# Patient Record
Sex: FEMALE | Race: WHITE | NOT HISPANIC OR LATINO | URBAN - METROPOLITAN AREA
[De-identification: names, ages, dates, MRNs, and addresses within clinical notes are randomized per-mention and may not be internally consistent; named-entity substitution may affect disease eponyms.]

---

## 2018-09-09 ENCOUNTER — EMERGENCY (EMERGENCY)
Facility: HOSPITAL | Age: 33
LOS: 0 days | Discharge: LEFT BEFORE DISCHARGE | End: 2018-09-09
Attending: EMERGENCY MEDICINE | Admitting: EMERGENCY MEDICINE

## 2018-09-09 VITALS
WEIGHT: 145.95 LBS | TEMPERATURE: 97 F | DIASTOLIC BLOOD PRESSURE: 73 MMHG | OXYGEN SATURATION: 97 % | RESPIRATION RATE: 20 BRPM | HEART RATE: 101 BPM | SYSTOLIC BLOOD PRESSURE: 158 MMHG

## 2018-09-09 VITALS
HEART RATE: 85 BPM | RESPIRATION RATE: 20 BRPM | OXYGEN SATURATION: 95 % | SYSTOLIC BLOOD PRESSURE: 105 MMHG | DIASTOLIC BLOOD PRESSURE: 59 MMHG

## 2018-09-09 DIAGNOSIS — Z91.018 ALLERGY TO OTHER FOODS: ICD-10-CM
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
## 2018-09-09 DIAGNOSIS — T78.2XXA ANAPHYLACTIC SHOCK, UNSPECIFIED, INITIAL ENCOUNTER: ICD-10-CM
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
## 2018-09-09 DIAGNOSIS — R21 RASH AND OTHER NONSPECIFIC SKIN ERUPTION: ICD-10-CM
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
## 2018-09-09 RX ORDER — DEXAMETHASONE 0.5 MG/5ML
10 ELIXIR ORAL ONCE
Qty: 0 | Refills: 0 | Status: COMPLETED | OUTPATIENT
Start: 2018-09-09 | End: 2018-09-09

## 2018-09-09 RX ORDER — EPINEPHRINE 0.3 MG/.3ML
0.3 INJECTION INTRAMUSCULAR; SUBCUTANEOUS
Qty: 0.3 | Refills: 0 | OUTPATIENT
Start: 2018-09-09

## 2018-09-09 RX ORDER — DIPHENHYDRAMINE HCL 50 MG
50 CAPSULE ORAL ONCE
Qty: 0 | Refills: 0 | Status: COMPLETED | OUTPATIENT
Start: 2018-09-09 | End: 2018-09-09

## 2018-09-09 RX ORDER — SODIUM CHLORIDE 9 MG/ML
1000 INJECTION INTRAMUSCULAR; INTRAVENOUS; SUBCUTANEOUS ONCE
Qty: 0 | Refills: 0 | Status: COMPLETED | OUTPATIENT
Start: 2018-09-09 | End: 2018-09-09

## 2018-09-09 RX ORDER — FAMOTIDINE 10 MG/ML
20 INJECTION INTRAVENOUS ONCE
Qty: 0 | Refills: 0 | Status: COMPLETED | OUTPATIENT
Start: 2018-09-09 | End: 2018-09-09

## 2018-09-09 RX ORDER — EPINEPHRINE 0.3 MG/.3ML
0.3 INJECTION INTRAMUSCULAR; SUBCUTANEOUS ONCE
Qty: 0 | Refills: 0 | Status: COMPLETED | OUTPATIENT
Start: 2018-09-09 | End: 2018-09-09

## 2018-09-09 RX ADMIN — Medication 50 MILLIGRAM(S): at 11:38

## 2018-09-09 RX ADMIN — Medication 125 MILLIGRAM(S): at 11:42

## 2018-09-09 RX ADMIN — Medication 10 MILLIGRAM(S): at 16:45

## 2018-09-09 RX ADMIN — EPINEPHRINE 0.3 MILLIGRAM(S): 0.3 INJECTION INTRAMUSCULAR; SUBCUTANEOUS at 11:34

## 2018-09-09 RX ADMIN — SODIUM CHLORIDE 1000 MILLILITER(S): 9 INJECTION INTRAMUSCULAR; INTRAVENOUS; SUBCUTANEOUS at 11:38

## 2018-09-09 RX ADMIN — FAMOTIDINE 20 MILLIGRAM(S): 10 INJECTION INTRAVENOUS at 12:23

## 2018-09-09 NOTE — ED ADULT NURSE REASSESSMENT NOTE - NS ED NURSE REASSESS COMMENT FT1
s/p allergic reaction at home after eating nuts. continuous pulse ox monitoring in place,. no respiratory distress, speaking in full sentences. maintaining own ariway
patient resting comfortably. will continue to monitor and assess, breathing comfortably.

## 2018-09-09 NOTE — ED PROVIDER NOTE - OBJECTIVE STATEMENT
Pt is a 34 y/o female with hx of allergy to nuts, presents to ED for skin rash with itching and throat swelling that started 1 hour PTA after eating nuts. Pt states took H2 blocker and epipen with mild relief. Pt denies abd pain, n/v.

## 2018-09-09 NOTE — ED PROVIDER NOTE - PROGRESS NOTE DETAILS
Pt partially improved after epi in the ED.  No resp distress, wheezing, stridor, still feels some throat swelling, mostly L side. Pt resting comfortably, no progression of sx. Pt endorsed to Dr. Barraza. Pt has no complaints at this time, will discharge.

## 2018-09-09 NOTE — ED PROVIDER NOTE - PHYSICAL EXAMINATION
Constitutional: Well developed, well nourished. NAD.  Head: Normocephalic, atraumatic.  Eyes: PERRL. EOMI.  ENT: No nasal discharge. Mucous membranes moist. No tongue, lip, uvular swelling. Airway patent.  Neck: Supple. Painless ROM.  Cardiovascular: Normal S1, S2. Regular rate and rhythm. No murmurs, rubs, or gallops.  Pulmonary: Normal respiratory rate and effort. Lungs clear to auscultation bilaterally. No wheezing, rales, or rhonchi.  Abdominal: Soft. Nondistended. Nontender. No rebound, guarding, rigidity.  Extremities. Pelvis stable. No lower extremity edema, symmetric calves.  Skin: + pruritic rash to upper extremities.  Neuro: AAOx3. No focal neurological deficits.  Psych: Normal mood. Normal affect.

## 2018-09-09 NOTE — ED PROVIDER NOTE - ATTENDING CONTRIBUTION TO CARE
This is a 33yoF known tree nut allergy who presents for throat swelling, itching and subjective difficulty swallowing immediately after eating 1 peanut.  She has previously eaten peanuts w/o issue though her allergist told her to avoid them. She felt her throat swelling and skin itching and used her epipen. When her sx persisted after epipen use, she called EMS.  No fever, recent illness.  No n/v.  No SOB, wheezing, stridor. Hives on her R forearm have since improved.    PMH: gestational DM  Meds: none  NKDA, known allergy to tree nuts  SH: lives at home    VS  /73  CONSTITUTIONAL: well developed; well nourished; well appearing in no acute distress  HEAD: normocephalic; atraumatic  EYES: PERRL, no conjunctival injection, no scleral icterus  ENT: no nasal discharge  M: MMM prominent tonsils, equivocal uvular edema, no lingual or lip edema  NECK: supple; non tender. + full passive ROM in all directions  CARD: S1, S2 normal; no murmurs, gallops, or rubs. Regular rate and rhythm  RESP: no stridor, no wheezes, rales or rhonchi. Good air movement bilaterally without significant accessory muscle use  EXT: moving all extremities spontaneously, normal ROM. No clubbing, cyanosis or edema  SKIN: warm and dry, no lesions noted  NEURO: alert, oriented, CN II-XII grossly intact,motor and sensory grossly intact, speech nonslurred, stable gait, no focal deficits. GCS 15  PSYCH: calm, cooperative, appropriate, good eye contact, logical thought process, no apparent danger to self or others    epi 0.3 IM  solumedrol, benadryl, pepcid  1L IV NS    reassess This is a 33yoF known tree nut allergy who presents for throat swelling, itching and subjective difficulty swallowing immediately after eating 1 peanut.  She has previously eaten peanuts w/o issue though her allergist told her to avoid them. She felt her throat swelling and skin itching and used her epipen. When her sx persisted after epipen use, she called EMS.  No fever, recent illness.  No n/v.  No SOB, wheezing, stridor. Hives on her R forearm have since improved.    PMH: gestational DM  Meds: none  NKDA, known allergy to tree nuts  SH: lives at home    VS  /73  CONSTITUTIONAL: well developed; well nourished; well appearing in no acute distress  HEAD: normocephalic; atraumatic  EYES: no conjunctival injection, no scleral icterus  ENT: no nasal discharge  M: MMM prominent tonsils, equivocal uvular edema, no lingual or lip edema  NECK: supple; non tender. + full passive ROM in all directions  CARD: S1, S2 normal; no murmurs, gallops, or rubs. Regular rate and rhythm  RESP: no stridor, no wheezes, rales or rhonchi. Good air movement bilaterally without significant accessory muscle use  EXT: moving all extremities spontaneously, normal ROM. No clubbing, cyanosis or edema  SKIN: warm and dry, no lesions noted  NEURO: alert, oriented, CN II-XII grossly intact, motor and sensory grossly intact, speech nonslurred, no focal deficits. GCS 15  PSYCH: calm, cooperative, appropriate, good eye contact, logical thought process, no apparent danger to self or others    epi 0.3 IM  solumedrol, benadryl, pepcid  1L IV NS    reassess

## 2018-09-09 NOTE — ED ADULT NURSE NOTE - NS ED NURSE ELOPE COMMENTS
MD aware patient eloped. patient alert and oriented x 3, steady gait. MD aware patient eloped. patient alert and oriented x 3, steady gait. IV Lock removed prior to eloping

## 2018-09-09 NOTE — ED ADULT NURSE NOTE - OBJECTIVE STATEMENT
pt presents to ER with difficulty swallowing and difficulty breathing after eating nuts. patient is alert and in no respiratory distress, speaking in full sentences. patient used epi-pen at home

## 2018-09-09 NOTE — ED PROVIDER NOTE - MEDICAL DECISION MAKING DETAILS
I personally evaluated the patient. I reviewed the Resident’s or Physician Assistant’s note (as assigned above), and agree with the findings and plan except as documented in my note. Patient has known allergy to tree nut, had an allergic reaction after eating peanut today, self administered epinephrine. Patient came to the ed, received epi in the ed at 1130, patient states she feels  much better. Repeat exam unremarkable, patient requesting to go home. Epi given second time because of "weird sensation" while swallowing. NO active cp/sob, Patient has eloped, does not want to stay in the ed, patient will be given decadron, patient has an epi pen at home and will see personal allergist in the morning, patient vieyra snot want to wait for ama. Patient understands risks and benefits of her decision and has full capacity.

## 2018-09-14 ENCOUNTER — EMERGENCY (EMERGENCY)
Facility: HOSPITAL | Age: 33
LOS: 0 days | Discharge: HOME | End: 2018-09-15
Attending: EMERGENCY MEDICINE | Admitting: EMERGENCY MEDICINE

## 2018-09-14 VITALS
SYSTOLIC BLOOD PRESSURE: 122 MMHG | TEMPERATURE: 98 F | DIASTOLIC BLOOD PRESSURE: 81 MMHG | OXYGEN SATURATION: 96 % | HEART RATE: 86 BPM | RESPIRATION RATE: 18 BRPM

## 2018-09-14 DIAGNOSIS — T78.40XA ALLERGY, UNSPECIFIED, INITIAL ENCOUNTER: ICD-10-CM

## 2018-09-14 DIAGNOSIS — Z79.899 OTHER LONG TERM (CURRENT) DRUG THERAPY: ICD-10-CM

## 2018-09-14 DIAGNOSIS — Z79.52 LONG TERM (CURRENT) USE OF SYSTEMIC STEROIDS: ICD-10-CM

## 2018-09-14 DIAGNOSIS — F32.9 MAJOR DEPRESSIVE DISORDER, SINGLE EPISODE, UNSPECIFIED: ICD-10-CM

## 2018-09-14 DIAGNOSIS — F32.9 MAJOR DEPRESSIVE DISORDER, SINGLE EPISODE, UNSPECIFIED: Chronic | ICD-10-CM

## 2018-09-14 DIAGNOSIS — L50.0 ALLERGIC URTICARIA: ICD-10-CM

## 2018-09-14 RX ORDER — SODIUM CHLORIDE 9 MG/ML
1000 INJECTION INTRAMUSCULAR; INTRAVENOUS; SUBCUTANEOUS ONCE
Qty: 0 | Refills: 0 | Status: COMPLETED | OUTPATIENT
Start: 2018-09-14 | End: 2018-09-14

## 2018-09-14 RX ORDER — ONDANSETRON 8 MG/1
4 TABLET, FILM COATED ORAL ONCE
Qty: 0 | Refills: 0 | Status: COMPLETED | OUTPATIENT
Start: 2018-09-14 | End: 2018-09-14

## 2018-09-14 RX ORDER — EPINEPHRINE 0.3 MG/.3ML
0.3 INJECTION INTRAMUSCULAR; SUBCUTANEOUS ONCE
Qty: 0 | Refills: 0 | Status: COMPLETED | OUTPATIENT
Start: 2018-09-14 | End: 2018-09-14

## 2018-09-14 RX ORDER — FAMOTIDINE 10 MG/ML
20 INJECTION INTRAVENOUS ONCE
Qty: 0 | Refills: 0 | Status: COMPLETED | OUTPATIENT
Start: 2018-09-14 | End: 2018-09-14

## 2018-09-14 RX ORDER — DIPHENHYDRAMINE HCL 50 MG
50 CAPSULE ORAL ONCE
Qty: 0 | Refills: 0 | Status: COMPLETED | OUTPATIENT
Start: 2018-09-14 | End: 2018-09-14

## 2018-09-14 RX ADMIN — EPINEPHRINE 0.3 MILLIGRAM(S): 0.3 INJECTION INTRAMUSCULAR; SUBCUTANEOUS at 23:46

## 2018-09-14 RX ADMIN — FAMOTIDINE 20 MILLIGRAM(S): 10 INJECTION INTRAVENOUS at 23:36

## 2018-09-14 RX ADMIN — ONDANSETRON 4 MILLIGRAM(S): 8 TABLET, FILM COATED ORAL at 23:47

## 2018-09-14 RX ADMIN — SODIUM CHLORIDE 2000 MILLILITER(S): 9 INJECTION INTRAMUSCULAR; INTRAVENOUS; SUBCUTANEOUS at 23:36

## 2018-09-14 RX ADMIN — Medication 50 MILLIGRAM(S): at 23:37

## 2018-09-14 NOTE — ED PROVIDER NOTE - MEDICAL DECISION MAKING DETAILS
pt with allergic reaction, given epi in ED, observed for 6 hours with no recurrence.  pt dc with meds and told to f/u with allergist

## 2018-09-14 NOTE — ED PROVIDER NOTE - OBJECTIVE STATEMENT
33 y.o. F with no PMH presents with SOB chest pain for 30 minutes. She felt her throat swell up after doing laundry, and chest pain, she felt pain in her stomach upon lying down. She states that this happened to her before on sunday, and she took an epipen which helped her symptoms. 33 y.o. F with no PMH presents with SOB for 30 minutes. She felt her throat swell up after doing laundry, she felt pain in her stomach upon lying down. She states that this happened to her before on sunday, and she took an Epipen which helped her symptoms. She has never experienced this before sunday.

## 2018-09-14 NOTE — ED ADULT NURSE NOTE - NSIMPLEMENTINTERV_GEN_ALL_ED
Implemented All Universal Safety Interventions:  [place] to call system. Call bell, personal items and telephone within reach. Instruct patient to call for assistance. Room bathroom lighting operational. Non-slip footwear when patient is off stretcher. Physically safe environment: no spills, clutter or unnecessary equipment. Stretcher in lowest position, wheels locked, appropriate side rails in place.

## 2018-09-14 NOTE — ED ADULT TRIAGE NOTE - CHIEF COMPLAINT QUOTE
Pt with C.O throat star swollen 30  minuted ago difficulty to swallow ,pt with previous allergic reaction in the past

## 2018-09-14 NOTE — ED PROVIDER NOTE - PHYSICAL EXAMINATION
CONSTITUTIONAL: Well-developed; well-nourished; in mild distress due to shortness of breath  SKIN: +urticaria on left hand, warm, dry  HEAD: Normocephalic; atraumatic.  ENT: No nasal discharge; airway clear.  NECK: Supple; non tender.  CARD: S1, S2 normal; no murmurs, gallops, or rubs. Regular rate and rhythm.   RESP: No wheezes, rales or rhonchi.  ABD: soft ntnd  EXT: Normal ROM.  No clubbing, cyanosis or edema.   NEURO: Alert, oriented, grossly unremarkable

## 2018-09-14 NOTE — ED PROVIDER NOTE - ATTENDING CONTRIBUTION TO CARE
I personally evaluated the patient. I reviewed the Resident’s or Physician Assistant’s note (as assigned above), and agree with the findings and plan except as documented in my note.     33 female here for allergic reaction has history of tree nut allergy, unsure of any recent triggers. Had recent eval for same and took epi pen on her own. Presents to ED complaining of SOB and allergic symptoms.     PE: female in no distress. HEENT: no lip swelling. no stridor or drooling. SKIN: mild urticaria to left hands. CHEST: normal work of breathing. CV: pulses intact. ABD: soft, non rigid, no guarding.     Impression: allergic reaction / anaphylaxis    Plan: IM epi given, IV labs supportive care and reevaluation

## 2018-09-14 NOTE — ED PROVIDER NOTE - PROGRESS NOTE DETAILS
Pt reports throat feels better, will continue to monitor Pt resting in bed upon examination. Is able to tolerate PO. Pt ready for discharge.

## 2018-09-14 NOTE — ED PROVIDER NOTE - NS ED ROS FT
Eyes:  No visual changes, eye pain or discharge.  ENMT:  +closed throat, No hearing changes, pain, no sore throat or runny nose, no difficulty swallowing  Cardiac:  + chest pain, or edema. No chest pain with exertion.  Respiratory: +SOB No cough or respiratory distress. No hemoptysis. No history of asthma or RAD.  GI:  +abdominal pain, No nausea, vomiting, diarrhea.  MS:  No myalgia, muscle weakness, joint pain or back pain.  Neuro:  No headache or weakness.  No LOC.  Skin:  No skin rash.   Endocrine: No history of thyroid disease or diabetes.

## 2018-09-15 VITALS
SYSTOLIC BLOOD PRESSURE: 105 MMHG | DIASTOLIC BLOOD PRESSURE: 63 MMHG | RESPIRATION RATE: 18 BRPM | OXYGEN SATURATION: 97 % | HEART RATE: 74 BPM | TEMPERATURE: 98 F

## 2018-09-15 RX ORDER — EPINEPHRINE 0.3 MG/.3ML
0.3 INJECTION INTRAMUSCULAR; SUBCUTANEOUS
Qty: 1 | Refills: 0 | OUTPATIENT
Start: 2018-09-15

## 2018-09-26 ENCOUNTER — EMERGENCY (EMERGENCY)
Facility: HOSPITAL | Age: 33
LOS: 0 days | Discharge: HOME | End: 2018-09-26
Attending: EMERGENCY MEDICINE | Admitting: EMERGENCY MEDICINE

## 2018-09-26 VITALS
SYSTOLIC BLOOD PRESSURE: 146 MMHG | RESPIRATION RATE: 18 BRPM | OXYGEN SATURATION: 97 % | HEART RATE: 102 BPM | DIASTOLIC BLOOD PRESSURE: 90 MMHG | TEMPERATURE: 97 F

## 2018-09-26 DIAGNOSIS — T45.0X5A ADVERSE EFFECT OF ANTIALLERGIC AND ANTIEMETIC DRUGS, INITIAL ENCOUNTER: ICD-10-CM

## 2018-09-26 DIAGNOSIS — Z79.52 LONG TERM (CURRENT) USE OF SYSTEMIC STEROIDS: ICD-10-CM

## 2018-09-26 DIAGNOSIS — Z91.010 ALLERGY TO PEANUTS: ICD-10-CM

## 2018-09-26 DIAGNOSIS — F32.9 MAJOR DEPRESSIVE DISORDER, SINGLE EPISODE, UNSPECIFIED: ICD-10-CM
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
## 2018-09-26 DIAGNOSIS — R11.2 NAUSEA WITH VOMITING, UNSPECIFIED: ICD-10-CM

## 2018-09-26 DIAGNOSIS — R05 COUGH: ICD-10-CM

## 2018-09-26 DIAGNOSIS — Z79.899 OTHER LONG TERM (CURRENT) DRUG THERAPY: ICD-10-CM
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
## 2018-09-26 DIAGNOSIS — F32.9 MAJOR DEPRESSIVE DISORDER, SINGLE EPISODE, UNSPECIFIED: Chronic | ICD-10-CM

## 2018-09-26 LAB
ALBUMIN SERPL ELPH-MCNC: 5.2 G/DL — SIGNIFICANT CHANGE UP (ref 3.5–5.2)
ALP SERPL-CCNC: 68 U/L — SIGNIFICANT CHANGE UP (ref 30–115)
ALT FLD-CCNC: 85 U/L — HIGH (ref 0–41)
ANION GAP SERPL CALC-SCNC: 18 MMOL/L — HIGH (ref 7–14)
AST SERPL-CCNC: 56 U/L — HIGH (ref 0–41)
BILIRUB SERPL-MCNC: 0.7 MG/DL — SIGNIFICANT CHANGE UP (ref 0.2–1.2)
BUN SERPL-MCNC: 9 MG/DL — LOW (ref 10–20)
CALCIUM SERPL-MCNC: 9.5 MG/DL — SIGNIFICANT CHANGE UP (ref 8.5–10.1)
CHLORIDE SERPL-SCNC: 99 MMOL/L — SIGNIFICANT CHANGE UP (ref 98–110)
CO2 SERPL-SCNC: 23 MMOL/L — SIGNIFICANT CHANGE UP (ref 17–32)
CREAT SERPL-MCNC: 0.5 MG/DL — LOW (ref 0.7–1.5)
GLUCOSE SERPL-MCNC: 144 MG/DL — HIGH (ref 70–99)
HCT VFR BLD CALC: 37.9 % — SIGNIFICANT CHANGE UP (ref 37–47)
HGB BLD-MCNC: 13 G/DL — SIGNIFICANT CHANGE UP (ref 12–16)
MCHC RBC-ENTMCNC: 29.2 PG — SIGNIFICANT CHANGE UP (ref 27–31)
MCHC RBC-ENTMCNC: 34.3 G/DL — SIGNIFICANT CHANGE UP (ref 32–37)
MCV RBC AUTO: 85.2 FL — SIGNIFICANT CHANGE UP (ref 81–99)
NRBC # BLD: 0 /100 WBCS — SIGNIFICANT CHANGE UP (ref 0–0)
PLATELET # BLD AUTO: 223 K/UL — SIGNIFICANT CHANGE UP (ref 130–400)
POTASSIUM SERPL-MCNC: 4.9 MMOL/L — SIGNIFICANT CHANGE UP (ref 3.5–5)
POTASSIUM SERPL-SCNC: 4.9 MMOL/L — SIGNIFICANT CHANGE UP (ref 3.5–5)
PROT SERPL-MCNC: 8.3 G/DL — HIGH (ref 6–8)
RBC # BLD: 4.45 M/UL — SIGNIFICANT CHANGE UP (ref 4.2–5.4)
RBC # FLD: 12.2 % — SIGNIFICANT CHANGE UP (ref 11.5–14.5)
SODIUM SERPL-SCNC: 140 MMOL/L — SIGNIFICANT CHANGE UP (ref 135–146)
WBC # BLD: 8.22 K/UL — SIGNIFICANT CHANGE UP (ref 4.8–10.8)
WBC # FLD AUTO: 8.22 K/UL — SIGNIFICANT CHANGE UP (ref 4.8–10.8)

## 2018-09-26 RX ORDER — DIPHENHYDRAMINE HCL 50 MG
25 CAPSULE ORAL ONCE
Qty: 0 | Refills: 0 | Status: COMPLETED | OUTPATIENT
Start: 2018-09-26 | End: 2018-09-26

## 2018-09-26 RX ORDER — DEXAMETHASONE 0.5 MG/5ML
10 ELIXIR ORAL ONCE
Qty: 0 | Refills: 0 | Status: COMPLETED | OUTPATIENT
Start: 2018-09-26 | End: 2018-09-26

## 2018-09-26 RX ADMIN — Medication 10 MILLIGRAM(S): at 22:05

## 2018-09-26 RX ADMIN — Medication 25 MILLIGRAM(S): at 22:05

## 2018-09-26 NOTE — ED PROVIDER NOTE - PHYSICAL EXAMINATION
PHYSICAL EXAM: I have reviewed current vital signs.  GENERAL: NAD, well-nourished; well-developed.  HEAD:  Normocephalic, atraumatic.  EYES: EOMI, PERRL, conjunctiva and sclera clear.  ENT: MMM, no erythema/exudates.  NECK: Supple, no JVD.  CHEST/LUNG: Clear to auscultation bilaterally; no wheezes, rales, or rhonchi.  HEART: Regular rate and rhythm, normal S1 and S2; no murmurs, rubs, or gallops.  ABDOMEN: Soft, nontender, nondistended; bowel sounds present.  EXTREMITIES:  2+ peripheral pulses; no clubbing, cyanosis, or edema.  PSYCH: Cooperative, appropriate, normal mood and affect.  NEUROLOGY: AAO x 3. Motor 5/5. Sensory intact. No focal neurological deficits. CN II - XII intact. (-) dysmetria, facial droop, pronator drift.  SKIN: No rashes or lesions. PHYSICAL EXAM: I have reviewed current vital signs.  GENERAL: NAD, well-nourished; well-developed.  HEAD:  Normocephalic, atraumatic.  EYES: EOMI, PERRL, conjunctiva and sclera clear.  ENT: MMM, no erythema/exudates.  NECK: Supple, no JVD.  CHEST/LUNG: Clear to auscultation bilaterally; no wheezes, rales, or rhonchi.  HEART: Regular rate and rhythm, normal S1 and S2; no murmurs, rubs, or gallops.  ABDOMEN: Soft, nontender, nondistended; bowel sounds present.  EXTREMITIES:  2+ peripheral pulses; no clubbing, cyanosis, or edema.  PSYCH: Very anxious, cooperative, appropriate, normal mood and affect.  NEUROLOGY: AAO x 3. Motor 5/5. Sensory intact. No focal neurological deficits.  SKIN: No rashes or lesions. PHYSICAL EXAM: I have reviewed current vital signs.  GENERAL: NAD, well-nourished; well-developed.  HEAD:  Normocephalic, atraumatic.  EYES: PERRL, conjunctiva and sclera clear.  ENT: MMM, no erythema/exudates, airway patent, uvula midline, no drooling/trismus.  NECK: Supple, no JVD.  CHEST/LUNG: Clear to auscultation bilaterally; no wheezes, rales, or rhonchi.  HEART: Regular rate and rhythm, normal S1 and S2; no murmurs, rubs, or gallops.  ABDOMEN: Soft, nontender, nondistended; bowel sounds present.  EXTREMITIES:  2+ peripheral pulses; no edema.  PSYCH: Very anxious, cooperative, appropriate, normal affect.  NEUROLOGY: AAO x 3. Motor 5/5. Sensory intact. No focal neurological deficits.  SKIN: No rashes or lesions. PHYSICAL EXAM: I have reviewed current vital signs.  GENERAL: NAD, well-nourished; well-developed.  HEAD:  Normocephalic, atraumatic.  EYES: PERRL, conjunctiva and sclera clear.  ENT: MMM, no erythema/exudates, airway patent, uvula midline, no drooling/trismus.  NECK: Supple, no JVD.  CHEST/LUNG: Clear to auscultation bilaterally; no wheezes, rales, or rhonchi.  HEART: Regular rate and rhythm, normal S1 and S2; no murmurs, rubs, or gallops.  ABDOMEN: Soft, nontender, nondistended; bowel sounds present.  EXTREMITIES:  2+ peripheral pulses; no edema.  PSYCH: Anxious, cooperative, appropriate, normal affect.  NEUROLOGY: AAO x 3. Motor 5/5. Sensory intact. No focal neurological deficits.  SKIN: No rashes or lesions. PHYSICAL EXAM: I have reviewed current vital signs.  GENERAL: NAD, well-nourished; well-developed.  HEAD:  Normocephalic, atraumatic.  EYES: PERRL, conjunctiva and sclera clear.  ENT: MMM, no erythema/exudates, airway patent, uvula midline, no drooling/trismus.  NECK: Supple, no JVD, no LAD.  CHEST/LUNG: Clear to auscultation bilaterally; no wheezes, rales, or rhonchi.  HEART: Regular rate and rhythm, normal S1 and S2; no murmurs, rubs, or gallops.  ABDOMEN: Soft, nontender, nondistended; bowel sounds present.  EXTREMITIES:  2+ peripheral pulses; no edema.  PSYCH: Anxious, cooperative, appropriate, normal affect.  NEUROLOGY: AAO x 3. Motor 5/5. Sensory intact. No focal neurological deficits.  SKIN: No rashes or lesions.

## 2018-09-26 NOTE — ED PROVIDER NOTE - MEDICAL DECISION MAKING DETAILS
seen for throat swelling sensation, seen in past for similar complaints, work up negaitve, symptoms resolved, no signs of anaphylaxis but patient felt better after steroids and benadrly, possible psych component as lab work/physical exam is completley nml. she is to fu with her pmd

## 2018-09-26 NOTE — ED ADULT NURSE NOTE - NSIMPLEMENTINTERV_GEN_ALL_ED
Implemented All Universal Safety Interventions:  [place] to call system. Call bell, personal items and telephone within reach. Instruct patient to call for assistance. Room bathroom lighting operational. Non-slip footwear when patient is off stretcher. Physically safe environment: no spills, clutter or unnecessary equipment. Stretcher in lowest position, wheels locked, appropriate side rails in place.

## 2018-09-26 NOTE — ED PROVIDER NOTE - OBJECTIVE STATEMENT
32yo F with PMH of prior allergic reaction to nuts p/w difficulty breathing and sensation of "throat swelling" that started just [place] 32yo F with PMH of prior allergic reaction to nuts p/w difficulty breathing and sensation of "throat swelling" that started just P[place] she [place] on the subway. She called an ambulance after when she was on the bus and felt symptoms of uneasiness/being anxious, right rib pain, tingling in her feet, scratchy throat, and vomiting/spitting up x 3. Denies any recent travel, substernal CP, history of cardiac problems, abdominal pain, back pain, extremity weakness, or syncope. 34yo F with PMH of prior allergic reaction to nuts p/w difficulty breathing and sensation of "throat swelling" that started just [place]. She called an ambulance after when she was on the bus and felt symptoms of uneasiness/being anxious, right rib pain, tingling in her feet, scratchy throat, throat swelling, and vomiting/spitting up x 3 (nonbloody). Tolerating secretions, no voice changes, no drooling. Also endorses vague abdominal fullness that started 15 minutes PTA. She has been to the ED twice in the last 2 weeks with similar sxs and has been worked up by an allergist and her PCP, was given an epi pen for home, no prior history of intubations. She is requesting labs be done in the ED today. Denies any recent travel, substernal CP, history of cardiac problems, back pain, extremity weakness, or syncope. PERC negative.

## 2018-09-26 NOTE — ED PROVIDER NOTE - NS ED ROS FT
Constitutional:  No fevers or chills.  Eyes:  No visual changes.  ENT:  Sensation of throat swelling- she is tolerating secretions, voice is normal, and O2 sat is normal.  Neck:  No neck pain.  Cardiac:  Complains of "twinges" of pain in her right ribs, no substernal CP.  Resp:  +Dry cough.  GI:  +N/v. No diarrhea or abdominal pain.  :  No dysuria, frequency, or hematuria.  MSK:  No myalgias or joint pain/swelling.  Neuro:  No headache, dizziness, or weakness.  Skin:  No skin rash.

## 2018-09-26 NOTE — ED PROVIDER NOTE - ATTENDING CONTRIBUTION TO CARE
34 yo female who presents with sensation that throat is closing after sitting in bus next to vent and inhaling fumes, she states she felt itching afterwards and trouble breathing as if she was having an allergic reaction, she took 1 benadryl, symptoms did improve but then reoccured when she got out of th bus with associated n/v. no chest pain, no pleuritic sob or trouble breathing. she has been seen her in the past for similar sensations without clear inciting event. on exam she is speaking full setnences, no change in voice, nml ability to swallow, no stridor, lungs cta, nml heart exam, abd is soft, oropharynx is completely nml with nml uvula, no nekc swelling. No signs of rash. This does not appear to be anaphylaxis, will give dose of steroids though and benadryl, she is PERC negative, will get ekg, and cxr.

## 2018-09-26 NOTE — ED PROVIDER NOTE - PROGRESS NOTE DETAILS
Patient states she is feeling better. Patient tolerating PO in ED. O2 sat WNL. No signs of impending airway. Will DC patient home and encouraged patient to f/u with her allergist and PCP.

## 2018-09-26 NOTE — ED ADULT NURSE NOTE - OBJECTIVE STATEMENT
pt felt allergic reaction on subway earlier, stated she felt her throat closing up, denies symptoms at this time, no airway obstruction, breathing unlabored, no facial swelling noted, no hives noted, denies itching

## 2018-09-26 NOTE — ED ADULT NURSE NOTE - CHPI ED NUR SYMPTOMS NEG
no nausea/no congestion/no difficulty breathing/no difficulty swallowing/no shortness of breath/no vomiting/no wheezing/no rash/no swelling of face, tongue/no throat itching

## 2020-12-31 NOTE — ED PROVIDER NOTE - CRITICAL CARE INDICATION, MLM
patient was critically ill... Patient was critically ill with a high probability of imminent or life threatening deterioration. English

## 2023-01-07 NOTE — ED ADULT NURSE REASSESSMENT NOTE - NS ED NURSE REASSESS COMMENT FT1
Pt A&O X3,NAD, VSS denies any pain at present time, no SOB. Pt sleeping comfortably on stretcher, bed in low position, SR^. Pt awaiting further MD evaluation, will continue to monitor.
07-Jan-2023 00:41